# Patient Record
Sex: FEMALE | Race: WHITE | NOT HISPANIC OR LATINO | Employment: UNEMPLOYED | URBAN - METROPOLITAN AREA
[De-identification: names, ages, dates, MRNs, and addresses within clinical notes are randomized per-mention and may not be internally consistent; named-entity substitution may affect disease eponyms.]

---

## 2021-04-29 ENCOUNTER — LAB (OUTPATIENT)
Dept: LAB | Facility: HOSPITAL | Age: 61
End: 2021-04-29

## 2021-04-29 ENCOUNTER — OFFICE VISIT (OUTPATIENT)
Dept: ENDOCRINOLOGY | Facility: CLINIC | Age: 61
End: 2021-04-29

## 2021-04-29 VITALS
WEIGHT: 159 LBS | DIASTOLIC BLOOD PRESSURE: 88 MMHG | TEMPERATURE: 97.3 F | SYSTOLIC BLOOD PRESSURE: 156 MMHG | BODY MASS INDEX: 28.17 KG/M2 | HEIGHT: 63 IN | HEART RATE: 64 BPM | OXYGEN SATURATION: 100 %

## 2021-04-29 DIAGNOSIS — E11.65 UNCONTROLLED TYPE 2 DIABETES MELLITUS WITH HYPERGLYCEMIA (HCC): Primary | ICD-10-CM

## 2021-04-29 DIAGNOSIS — E11.65 UNCONTROLLED TYPE 2 DIABETES MELLITUS WITH HYPERGLYCEMIA (HCC): ICD-10-CM

## 2021-04-29 PROCEDURE — 36415 COLL VENOUS BLD VENIPUNCTURE: CPT

## 2021-04-29 PROCEDURE — 86341 ISLET CELL ANTIBODY: CPT

## 2021-04-29 PROCEDURE — 99204 OFFICE O/P NEW MOD 45 MIN: CPT | Performed by: INTERNAL MEDICINE

## 2021-04-29 RX ORDER — INSULIN DETEMIR 100 [IU]/ML
40-45 INJECTION, SOLUTION SUBCUTANEOUS DAILY
COMMUNITY

## 2021-04-29 RX ORDER — ERGOCALCIFEROL (VITAMIN D2) 10 MCG
400 TABLET ORAL 2 TIMES DAILY
COMMUNITY

## 2021-04-29 RX ORDER — ATORVASTATIN CALCIUM 40 MG/1
40 TABLET, FILM COATED ORAL DAILY
COMMUNITY
End: 2022-04-05 | Stop reason: ALTCHOICE

## 2021-04-29 NOTE — ASSESSMENT & PLAN NOTE
Based upon her body build , her hx with diabetes and her lack of family hx of diabetes, I think there is a reasonable chance she is actually an adult onset type 1 diabetic.  We will check serologies.  If positive, we will stop metformin and adjust insulin to control her blood sugars using data from the wade device I gave her    If negative we will add an sglt2 inhibitor. Side effects were reviewed

## 2021-05-01 LAB — GAD65 AB SER IA-ACNC: 239.2 U/ML (ref 0–5)

## 2021-05-03 LAB — PANC ISLET CELL AB TITR SER: NEGATIVE {TITER}

## 2021-05-14 NOTE — TELEPHONE ENCOUNTER
Called pt - left a message to return call.  We down loaded her wade and lcm want to increase insulin at meals by 4 units.  she voiced understanding  She would like to continue the wade.  Will send in rx.  Last visit 4/29/21  Next visit 7/30/21

## 2021-09-10 ENCOUNTER — TELEPHONE (OUTPATIENT)
Dept: INTERNAL MEDICINE | Facility: CLINIC | Age: 61
End: 2021-09-10

## 2021-09-10 NOTE — TELEPHONE ENCOUNTER
Caller: SUZANNE     Relationship: [facility] [place] OFFICE    Best call back number: [phone]    What is the best time to reach you: CAIT    Who are you requesting to speak with (clinical staff, provider,  specific staff member): CE    Do you know the name of the person who called: NA    What was the call regarding: SUZANNE CALLED TO VERIFY PATIENTS INSULIN ASPART (NOVOLOG FLEXPEN) 100 UNIT/ML INSTRUCTIONS; WHETHER THE INSTRUCTIONS IS BASED ON PATIENTS GLUCOSE LEVELS.    Do you require a callback: YES         “ Thank you for sharing this information with me. I will send a message to the . Please allow up to 48 hours for the  to follow up on this request.”

## 2021-10-22 ENCOUNTER — OFFICE VISIT (OUTPATIENT)
Dept: ENDOCRINOLOGY | Facility: CLINIC | Age: 61
End: 2021-10-22

## 2021-10-22 VITALS
OXYGEN SATURATION: 97 % | BODY MASS INDEX: 28.88 KG/M2 | WEIGHT: 163 LBS | DIASTOLIC BLOOD PRESSURE: 84 MMHG | HEIGHT: 63 IN | HEART RATE: 61 BPM | SYSTOLIC BLOOD PRESSURE: 135 MMHG

## 2021-10-22 DIAGNOSIS — E10.65 UNCONTROLLED TYPE 1 DIABETES MELLITUS WITH HYPERGLYCEMIA (HCC): Primary | ICD-10-CM

## 2021-10-22 PROBLEM — E11.65 UNCONTROLLED TYPE 2 DIABETES MELLITUS WITH HYPERGLYCEMIA (HCC): Status: RESOLVED | Noted: 2021-04-29 | Resolved: 2021-10-22

## 2021-10-22 LAB
EXPIRATION DATE: ABNORMAL
EXPIRATION DATE: NORMAL
GLUCOSE BLDC GLUCOMTR-MCNC: 417 MG/DL (ref 70–130)
HBA1C MFR BLD: 10.3 %
Lab: ABNORMAL
Lab: NORMAL

## 2021-10-22 PROCEDURE — 82947 ASSAY GLUCOSE BLOOD QUANT: CPT | Performed by: INTERNAL MEDICINE

## 2021-10-22 PROCEDURE — 83036 HEMOGLOBIN GLYCOSYLATED A1C: CPT | Performed by: INTERNAL MEDICINE

## 2021-10-22 PROCEDURE — 3046F HEMOGLOBIN A1C LEVEL >9.0%: CPT | Performed by: INTERNAL MEDICINE

## 2021-10-22 PROCEDURE — 99213 OFFICE O/P EST LOW 20 MIN: CPT | Performed by: INTERNAL MEDICINE

## 2021-10-22 RX ORDER — BLOOD SUGAR DIAGNOSTIC
1 STRIP MISCELLANEOUS 3 TIMES DAILY
COMMUNITY
Start: 2021-10-15

## 2021-10-22 NOTE — PROGRESS NOTES
"     Office Note      Date: 10/22/2021  Patient Name: Milagros Lim  MRN: [id number]  : 1960    Chief Complaint   Patient presents with   • Diabetes     type 2       History of Present Illness:   Milagros Lim is a 61 y.o. female who presents for Diabetes -turns out to be type 1 with + GADA antibodies  She has a wade reader and used the sensor for 2 weeks and would like to continue  Otherwise is checking bg at least 4 times per day.  Hypoglycemia- was 61 this am.      Last A1c:13.3  Hemoglobin A1C   Date Value Ref Range Status   10/22/2021 10.3 % Final       Changes in health since last visit: more hypoglycemia. Last eye exam way overdue .    Subjective            Review of Systems:   Review of Systems   Constitutional: Negative.    Eyes: Negative.        The following portions of the patient's history were reviewed and updated as appropriate: allergies, current medications, past family history, past medical history, past social history, past surgical history and problem list.    Objective     Visit Vitals  /84 (BP Location: Left arm, Patient Position: Sitting, Cuff Size: Adult)   Pulse 61   Ht 160 cm (63\")   Wt 73.9 kg (163 lb)   SpO2 97%   BMI 28.87 kg/m²       Labs:    CMP  No results found for: GLUCOSE, BUN, CREATININE, EGFRIFNONA, EGFRIFAFRI, BCR, K, CO2, CALCIUM, PROTENTOTREF, LABIL2, BILIRUBIN, AST, ALT     CBC w/DIFF  No results found for: WBC, RBC, HGB, HCT, MCV, MCH, MCHC, RDW, RDWSD, MPV, PLT, NEUTRORELPCT, LYMPHORELPCT, MONORELPCT, EOSRELPCT, BASORELPCT, AUTOIGPER, NEUTROABS, LYMPHSABS, MONOSABS, EOSABS, BASOSABS, AUTOIGNUM, NRBC    Physical Exam:  Physical Exam  Vitals reviewed.   Constitutional:       Appearance: Normal appearance.   Neurological:      Mental Status: She is alert.          Assessment / Plan      Assessment & Plan:  Problem List Items Addressed This Visit        Other    Uncontrolled type 1 diabetes mellitus with hyperglycemia (HCC) - Primary    Current " Assessment & Plan     Diabetes is improving with treatment.   Continue current treatment regimen.  Diabetes will be reassessed in 3 months     Best a1c for her ever.  The plan is insulin  And to get a wade. That will be through a medical supply store. .         Relevant Medications    insulin aspart (novoLOG FLEXPEN) 100 UNIT/ML solution pen-injector sc pen    insulin detemir (Levemir FlexTouch) 100 UNIT/ML injection    Other Relevant Orders    POC Glycosylated Hemoglobin (Hb A1C) (Completed)    POC Glucose, Blood (Completed)           Neo Escobar MD   10/22/2021

## 2021-10-22 NOTE — ASSESSMENT & PLAN NOTE
Diabetes is improving with treatment.   Continue current treatment regimen.  Diabetes will be reassessed in 3 months     Best a1c for her ever.  The plan is insulin  And to get a wade. That will be through a medical supply store. .

## 2021-11-04 ENCOUNTER — TELEPHONE (OUTPATIENT)
Dept: ENDOCRINOLOGY | Facility: CLINIC | Age: 61
End: 2021-11-04

## 2021-11-15 ENCOUNTER — TELEPHONE (OUTPATIENT)
Dept: ENDOCRINOLOGY | Facility: CLINIC | Age: 61
End: 2021-11-15

## 2022-04-05 ENCOUNTER — OFFICE VISIT (OUTPATIENT)
Dept: ENDOCRINOLOGY | Facility: CLINIC | Age: 62
End: 2022-04-05

## 2022-04-05 VITALS
HEART RATE: 80 BPM | SYSTOLIC BLOOD PRESSURE: 168 MMHG | HEIGHT: 63 IN | OXYGEN SATURATION: 98 % | BODY MASS INDEX: 29.77 KG/M2 | DIASTOLIC BLOOD PRESSURE: 90 MMHG | WEIGHT: 168 LBS

## 2022-04-05 DIAGNOSIS — E10.65 UNCONTROLLED TYPE 1 DIABETES MELLITUS WITH HYPERGLYCEMIA: Primary | ICD-10-CM

## 2022-04-05 DIAGNOSIS — E10.649 TYPE 1 DIABETES MELLITUS WITH HYPOGLYCEMIA AND WITHOUT COMA: ICD-10-CM

## 2022-04-05 DIAGNOSIS — I10 ESSENTIAL HYPERTENSION: ICD-10-CM

## 2022-04-05 LAB
EXPIRATION DATE: ABNORMAL
EXPIRATION DATE: NORMAL
GLUCOSE BLDC GLUCOMTR-MCNC: 273 MG/DL (ref 70–130)
HBA1C MFR BLD: 9.1 %
Lab: ABNORMAL
Lab: NORMAL

## 2022-04-05 PROCEDURE — 99214 OFFICE O/P EST MOD 30 MIN: CPT | Performed by: PHYSICIAN ASSISTANT

## 2022-04-05 PROCEDURE — 83036 HEMOGLOBIN GLYCOSYLATED A1C: CPT | Performed by: PHYSICIAN ASSISTANT

## 2022-04-05 PROCEDURE — 3046F HEMOGLOBIN A1C LEVEL >9.0%: CPT | Performed by: PHYSICIAN ASSISTANT

## 2022-04-05 PROCEDURE — 95251 CONT GLUC MNTR ANALYSIS I&R: CPT | Performed by: PHYSICIAN ASSISTANT

## 2022-04-05 RX ORDER — LOSARTAN POTASSIUM 50 MG/1
50 TABLET ORAL DAILY
COMMUNITY
Start: 2022-02-21

## 2022-04-05 NOTE — PATIENT INSTRUCTIONS
Levemir 30 units once daily at bedtime  Novolog 6-8 units with breakfast and Lunch 10-12 with supper  Try to be more consistent with meals-- regular times, consistent carb intake

## 2022-04-05 NOTE — PROGRESS NOTES
Office Note      Date: 2022  Patient Name: Milagros Lim  MRN: [id number]  : 1960    Chief Complaint   Patient presents with   • Diabetes       History of Present Illness:   Milagros Lmi is a 61 y.o. female who presents for follow-up for type 1 diabetes.  She is accompanied by her brother today.  Recently she has had a lot of trouble with hypoglycemia.  She reports she was diagnosed with COVID in February.  She was not hospitalized but had a lot of gastrointestinal symptoms symptoms and had to get fluids going to the emergency department.  She reports following COVID she started having a lot of trouble with hypoglycemia.  Last week she was at jury duty and they had to call EMS because her blood glucose had dropped to 38 mg/dL.  Her brother decided enough is enough and bought her the freestyle wade sensor which she was unable to afford before.  Reports she has since been in contact with her insurance and they are shipping her freestyle wade to use.  Reports last week she started reducing her Levemir 40 to 45 units.  She reports this week she has been doing 30 units at bedtime and has not had any more trouble with hypoglycemia.  She reports she has not been consistently eating regular meals but did have to reduce her NovoLog she was taking 6 to 8 units with meals.  She reports she feels bad if her blood glucose is 90 mg/dL or less.  She reports she is up-to-date on her eye exam she went after Dr. Escobar advised this at her appointment in October.  She reports she was started on losartan 50 mg daily for elevated blood pressure.  She has been checking her blood pressure readings at home and these are typically 120-140/80.  She has been on the losartan 2 to 3 weeks.      Subjective     Review of Systems:   Review of Systems   Constitutional: Negative.    Cardiovascular: Negative.    Gastrointestinal: Negative.    Endocrine: Negative.    Neurological: Negative.        The following  "portions of the patient's history were reviewed and updated as appropriate: allergies, current medications, past family history, past medical history, past social history, past surgical history and problem list.    Objective     Vitals:    04/05/22 0955   BP: 168/90   Pulse: 80   SpO2: 98%   Weight: 76.2 kg (168 lb)   Height: 160 cm (63\")   PainSc: 0-No pain     Body mass index is 29.76 kg/m².    Physical Exam  Vitals reviewed.   Constitutional:       General: She is not in acute distress.     Appearance: Normal appearance.   Neurological:      Mental Status: She is alert.         HEMOGLOBIN A1C  Lab Results   Component Value Date    HGBA1C 9.1 04/05/2022       GLUCOSE  Glucose   Date Value Ref Range Status   04/05/2022 273 (A) 70 - 130 mg/dL Final           Assessment / Plan      Assessment & Plan:  1. Uncontrolled type 1 diabetes mellitus with hyperglycemia (HCC)  A1c today has improved as compared to October at 9.1%.  This is still above goal.  I reviewed her freestyle wade download today.  She has had fluctuating blood sugars.  For now she will continue the Levemir 30 units at bedtime and she will continue 6 to 8 units with breakfast and lunch but we will increase her NovoLog with supper to 10 to 12 units.  I encouraged regular eating habits with consistent carbohydrate intake.  I suggested diabetes education but she refused at this time.  She will send in blood sugar readings for review and adjustment as needed.  I suggested she keep her follow-up appointment in May in case she continues to have trouble with blood glucose readings if her blood sugars start to improve in the next few weeks she will reschedule this appointment for 3 months from now.  Weight is up 5 pounds since her appointment in October.  - POC Glycosylated Hemoglobin (Hb A1C)  - POC Glucose, Blood    2. Type 1 diabetes mellitus with hypoglycemia and without coma (HCC)  Has not had any more trouble with hypoglycemia since she reduced her " insulin.  She will continue the 30 units of Levemir at bedtime and NovoLog 6 to 8 units with breakfast and lunch and 10 to 12 units with supper.  Continue to use the freestyle wade which alerts her when her blood glucose is getting low.    3. Essential hypertension  Her blood pressure is elevated today.  She reports she forgot to take her losartan last night.  Her reported blood pressures at home have been okay.  For now she will continue the losartan 50 mg daily.      Return for keep May  appt for now.     This note was dictated using Dragon voice recognition.    Christine Osorio PA-C  04/05/2022

## 2022-09-14 NOTE — TELEPHONE ENCOUNTER
[facility] NEEDS NEW PRESCRIPTION FOR KATIE 2 SENSORS. FAX NUMBER [phone]

## 2022-10-06 ENCOUNTER — OFFICE VISIT (OUTPATIENT)
Dept: ENDOCRINOLOGY | Facility: CLINIC | Age: 62
End: 2022-10-06

## 2022-10-06 VITALS
BODY MASS INDEX: 31.36 KG/M2 | HEIGHT: 63 IN | HEART RATE: 75 BPM | DIASTOLIC BLOOD PRESSURE: 83 MMHG | SYSTOLIC BLOOD PRESSURE: 130 MMHG | OXYGEN SATURATION: 96 % | WEIGHT: 177 LBS

## 2022-10-06 DIAGNOSIS — E10.65 UNCONTROLLED TYPE 1 DIABETES MELLITUS WITH HYPERGLYCEMIA: Primary | ICD-10-CM

## 2022-10-06 LAB
EXPIRATION DATE: ABNORMAL
EXPIRATION DATE: NORMAL
GLUCOSE BLDC GLUCOMTR-MCNC: 315 MG/DL (ref 70–130)
HBA1C MFR BLD: 8.4 %
Lab: ABNORMAL
Lab: NORMAL

## 2022-10-06 PROCEDURE — 83036 HEMOGLOBIN GLYCOSYLATED A1C: CPT | Performed by: INTERNAL MEDICINE

## 2022-10-06 PROCEDURE — 3052F HG A1C>EQUAL 8.0%<EQUAL 9.0%: CPT | Performed by: INTERNAL MEDICINE

## 2022-10-06 PROCEDURE — 99213 OFFICE O/P EST LOW 20 MIN: CPT | Performed by: INTERNAL MEDICINE

## 2022-10-06 PROCEDURE — 95251 CONT GLUC MNTR ANALYSIS I&R: CPT | Performed by: INTERNAL MEDICINE

## 2022-10-06 RX ORDER — ATORVASTATIN CALCIUM 40 MG/1
40 TABLET, FILM COATED ORAL DAILY
COMMUNITY
Start: 2022-09-29

## 2022-10-06 NOTE — PROGRESS NOTES
"     Office Note      Date: 10/06/2022  Patient Name: Milagros Lim  MRN: [id number]  : 1960    Chief Complaint   Patient presents with   • Diabetes       History of Present Illness:   Milagros Lim is a 62 y.o. female who presents for Diabetes - TYPE 1  RX: 4 INSULIN SHOTS PER DAY  BG CHECKS- are done ad enrique with a wade.   2 weeks  Of wade  Were  Reviewed. 3 %  Low. 47 % in rnage. 26 % high and 24 % very high     Last A1c:  Hemoglobin A1C   Date Value Ref Range Status   10/06/2022 8.4 % Final       Changes in health since last visit: none . Last eye exam UP TO DATE.    Subjective          Review of Systems:   Review of Systems   Constitutional: Negative.    HENT: Negative.    Eyes: Negative.    Respiratory: Negative.        The following portions of the patient's history were reviewed and updated as appropriate: allergies, current medications, past family history, past medical history, past social history, past surgical history and problem list.    Objective     Visit Vitals  /83   Pulse 75   Ht 160 cm (63\")   Wt 80.3 kg (177 lb)   SpO2 96%   BMI 31.35 kg/m²       Labs:    CMP  No results found for: GLUCOSE, BUN, CREATININE, EGFRIFNONA, EGFRIFAFRI, BCR, K, CO2, CALCIUM, PROTENTOTREF, LABIL2, BILIRUBIN, AST, ALT     CBC w/DIFF  No results found for: WBC, RBC, HGB, HCT, MCV, MCH, MCHC, RDW, RDWSD, MPV, PLT, NEUTRORELPCT, LYMPHORELPCT, MONORELPCT, EOSRELPCT, BASORELPCT, AUTOIGPER, NEUTROABS, LYMPHSABS, MONOSABS, EOSABS, BASOSABS, AUTOIGNUM, NRBC    Physical Exam:  Physical Exam  Vitals reviewed.   Constitutional:       Appearance: Normal appearance. She is normal weight.   Cardiovascular:      Pulses:           Dorsalis pedis pulses are 2+ on the right side and 2+ on the left side.        Posterior tibial pulses are 2+ on the right side and 2+ on the left side.   Musculoskeletal:      Right foot: Normal range of motion. No deformity, bunion, Charcot foot, foot drop or prominent metatarsal " heads.      Left foot: Normal range of motion. No deformity, bunion, Charcot foot, foot drop or prominent metatarsal heads.   Feet:      Right foot:      Protective Sensation: 5 sites tested. 5 sites sensed.      Skin integrity: Skin integrity normal.      Toenail Condition: Right toenails are normal.      Left foot:      Protective Sensation: 5 sites tested. 5 sites sensed.      Skin integrity: Skin integrity normal.      Toenail Condition: Left toenails are normal.      Comments: Diabetic Foot Exam Performed and Monofilament Test Performed    Neurological:      Mental Status: She is alert.   Psychiatric:         Mood and Affect: Mood normal.         Behavior: Behavior normal.         Thought Content: Thought content normal.         Judgment: Judgment normal.          Assessment / Plan      Assessment & Plan:  Problem List Items Addressed This Visit        Other    Uncontrolled type 1 diabetes mellitus with hyperglycemia (HCC) - Primary    Current Assessment & Plan      Improved with a1c down to 8.4   pattern on wade shows she is taking too much levemir and not enough  novolog  Reduce levemir by 4 units and take 2 more units of log at each  meal         Relevant Medications    insulin aspart (novoLOG FLEXPEN) 100 UNIT/ML solution pen-injector sc pen    insulin detemir (Levemir FlexTouch) 100 UNIT/ML injection    Other Relevant Orders    POC Glucose, Blood (Completed)    POC Glycosylated Hemoglobin (Hb A1C) (Completed)           Neo Escobar MD   10/06/2022

## 2022-10-06 NOTE — ASSESSMENT & PLAN NOTE
Improved with a1c down to 8.4   pattern on wade shows she is taking too much levemir and not enough  novolog  Reduce levemir by 4 units and take 2 more units of log at each  meal

## 2022-12-29 ENCOUNTER — TELEPHONE (OUTPATIENT)
Dept: ENDOCRINOLOGY | Facility: CLINIC | Age: 62
End: 2022-12-29

## 2022-12-29 NOTE — TELEPHONE ENCOUNTER
Pt called back. Pt needs freestyle transmitter- not the sensors    Please send prescription to     [facility]  Phone: [phone] Fax: [phone]      Call pt   [phone]

## 2022-12-29 NOTE — TELEPHONE ENCOUNTER
Pt called and Pt's freestyle wade broke.     [facility]  [facility] [place] [place]  Phone: [phone]  Fax: [phone]    Call pt and can leave message  [phone]

## 2023-04-18 ENCOUNTER — OFFICE VISIT (OUTPATIENT)
Dept: ENDOCRINOLOGY | Facility: CLINIC | Age: 63
End: 2023-04-18
Payer: MEDICARE

## 2023-04-18 VITALS
BODY MASS INDEX: 31.86 KG/M2 | TEMPERATURE: 97.1 F | WEIGHT: 179.8 LBS | DIASTOLIC BLOOD PRESSURE: 88 MMHG | HEIGHT: 63 IN | SYSTOLIC BLOOD PRESSURE: 180 MMHG | OXYGEN SATURATION: 98 % | RESPIRATION RATE: 21 BRPM | HEART RATE: 65 BPM

## 2023-04-18 DIAGNOSIS — I10 ESSENTIAL HYPERTENSION: ICD-10-CM

## 2023-04-18 DIAGNOSIS — E10.65 UNCONTROLLED TYPE 1 DIABETES MELLITUS WITH HYPERGLYCEMIA: Primary | ICD-10-CM

## 2023-04-18 LAB
ALBUMIN UR-MCNC: <1.2 MG/DL
CREAT UR-MCNC: 33 MG/DL
EXPIRATION DATE: ABNORMAL
EXPIRATION DATE: NORMAL
GLUCOSE BLDC GLUCOMTR-MCNC: 164 MG/DL (ref 70–130)
HBA1C MFR BLD: 8.4 %
Lab: ABNORMAL
Lab: NORMAL
MICROALBUMIN/CREAT UR: NORMAL MG/G{CREAT}

## 2023-04-18 PROCEDURE — 82570 ASSAY OF URINE CREATININE: CPT | Performed by: INTERNAL MEDICINE

## 2023-04-18 PROCEDURE — 3052F HG A1C>EQUAL 8.0%<EQUAL 9.0%: CPT | Performed by: INTERNAL MEDICINE

## 2023-04-18 PROCEDURE — 82947 ASSAY GLUCOSE BLOOD QUANT: CPT | Performed by: INTERNAL MEDICINE

## 2023-04-18 PROCEDURE — 99214 OFFICE O/P EST MOD 30 MIN: CPT | Performed by: INTERNAL MEDICINE

## 2023-04-18 PROCEDURE — 3079F DIAST BP 80-89 MM HG: CPT | Performed by: INTERNAL MEDICINE

## 2023-04-18 PROCEDURE — 83036 HEMOGLOBIN GLYCOSYLATED A1C: CPT | Performed by: INTERNAL MEDICINE

## 2023-04-18 PROCEDURE — 1160F RVW MEDS BY RX/DR IN RCRD: CPT | Performed by: INTERNAL MEDICINE

## 2023-04-18 PROCEDURE — 1159F MED LIST DOCD IN RCRD: CPT | Performed by: INTERNAL MEDICINE

## 2023-04-18 PROCEDURE — 3077F SYST BP >= 140 MM HG: CPT | Performed by: INTERNAL MEDICINE

## 2023-04-18 PROCEDURE — 82043 UR ALBUMIN QUANTITATIVE: CPT | Performed by: INTERNAL MEDICINE

## 2023-04-18 RX ORDER — LOSARTAN POTASSIUM 100 MG/1
100 TABLET ORAL DAILY
Qty: 90 TABLET | Refills: 3 | Status: SHIPPED | OUTPATIENT
Start: 2023-04-18 | End: 2024-04-17

## 2023-04-18 NOTE — PROGRESS NOTES
"     Office Note      Date: 2023  Patient Name: Milagros Lim  MRN: [id number]  : 1960    Chief Complaint   Patient presents with   • Diabetes     X6 Months fu       History of Present Illness:   Milagros Lim is a 62 y.o. female who presents for Diabetes type 1.   Current RX 4 insulin shots per day    Bg checks are done:>10 times per day  With wade   Hypoglycemia :rare/  None severe in the last 3 months.   Wade has  Been a big help       Last A1c:8.4  Hemoglobin A1C   Date Value Ref Range Status   2023 8.4 % Final       Changes in health since last visit: none . Last eye exam due \up to date on cholesterol and foot check.    Subjective              Review of Systems:   Review of Systems   Respiratory: Negative for chest tightness and shortness of breath.    Skin: Negative for wound.       The following portions of the patient's history were reviewed and updated as appropriate: allergies, current medications, past family history, past medical history, past social history, past surgical history and problem list.    Objective     Visit Vitals  /88   Pulse 65   Temp 97.1 °F (36.2 °C) (Infrared)   Resp 21   Ht 160 cm (62.99\")   Wt 81.6 kg (179 lb 12.8 oz)   SpO2 98%   BMI 31.86 kg/m²           Physical Exam:  Physical Exam  Vitals reviewed.   Constitutional:       Appearance: Normal appearance.   Neurological:      Mental Status: She is alert.   Psychiatric:         Mood and Affect: Mood normal.         Behavior: Behavior normal.         Thought Content: Thought content normal.         Judgment: Judgment normal.          Assessment / Plan      Assessment & Plan:  Problem List Items Addressed This Visit        Other    Uncontrolled type 1 diabetes mellitus with hyperglycemia - Primary    Current Assessment & Plan      a1c unchanged but not a disaster/ pattern on the  Wade ( reviewed on her phone) shows she  Continues to take too much levemir and not enough insulin at meals. " Doses were adjusted          Relevant Medications    insulin aspart (novoLOG FLEXPEN) 100 UNIT/ML solution pen-injector sc pen    insulin detemir (Levemir FlexTouch) 100 UNIT/ML injection    Other Relevant Orders    POC Glycosylated Hemoglobin (Hb A1C) (Completed)    POC Glucose, Blood (Completed)    Microalbumin / Creatinine Urine Ratio - Urine, Clean Catch    Essential hypertension    Current Assessment & Plan     bp too high both here and at home. Will increase losartan          Relevant Medications    losartan (Cozaar) 100 MG tablet        Neo Escobar MD   04/18/2023

## 2023-04-18 NOTE — ASSESSMENT & PLAN NOTE
a1c unchanged but not a disaster/ pattern on the  Fede ( reviewed on her phone) shows she  Continues to take too much levemir and not enough insulin at meals. Doses were adjusted

## 2023-04-26 RX ORDER — INSULIN LISPRO 100 [IU]/ML
8-20 INJECTION, SOLUTION INTRAVENOUS; SUBCUTANEOUS
Qty: 18 ML | Refills: 5 | Status: SHIPPED | OUTPATIENT
Start: 2023-04-26

## 2023-04-26 NOTE — TELEPHONE ENCOUNTER
[facility] [phone]  pts novolog is not covered it needs a pa they will fax over the denial to us   or call something else in for her  humalog or lispro

## 2024-01-02 NOTE — TELEPHONE ENCOUNTER
Rx Refill Note  Requested Prescriptions     Pending Prescriptions Disp Refills    Continuous Blood Gluc Sensor (FreeStyle Fede 2 Sensor) misc [Pharmacy Med Name: FREESTYLE FEDE 2 SENSOR [place] Miscellaneous] 2 each 7     Sig: USE AS DIRECTED. CHANGE EVERY 14 DAYS      Last office visit with prescribing clinician: 4/18/2023   Last telemedicine visit with prescribing clinician: Visit date not found   Next office visit with prescribing clinician: Visit date not found                         Would you like a call back once the refill request has been completed: [] Yes [] No    If the office needs to give you a call back, can they leave a voicemail: [] Yes [] No    Hina Lay MA  01/02/24, 10:59 EST

## 2024-02-14 RX ORDER — INSULIN LISPRO 100 [IU]/ML
INJECTION, SOLUTION INTRAVENOUS; SUBCUTANEOUS
Qty: 15 ML | Refills: 5 | Status: SHIPPED | OUTPATIENT
Start: 2024-02-14

## 2024-04-09 DIAGNOSIS — I10 ESSENTIAL HYPERTENSION: ICD-10-CM

## 2024-04-09 NOTE — TELEPHONE ENCOUNTER
Rx Refill Note  Requested Prescriptions     Pending Prescriptions Disp Refills    losartan (COZAAR) 100 MG tablet [Pharmacy Med Name: LOSARTAN POTASSIUM 100 MG T 100 Tablet] 90 tablet 3     Sig: TAKE 1 TABLET BY MOUTH DAILY        Last office visit with prescribing clinician: 4/18/2023      Next office visit with prescribing clinician: Visit date not found       Michelle Hardwick (Jodi)  04/09/24, 14:12 EDT

## 2024-04-10 RX ORDER — LOSARTAN POTASSIUM 100 MG/1
100 TABLET ORAL DAILY
Qty: 90 TABLET | Refills: 1 | Status: SHIPPED | OUTPATIENT
Start: 2024-04-10

## 2024-07-29 NOTE — TELEPHONE ENCOUNTER
Called and spoke with patient to schedule her follow up appt for us to refill her Fede sensors.  Patient is no longer seeing us,   patient is now with [facility].

## 2024-08-07 NOTE — TELEPHONE ENCOUNTER
Spoke with patient.  Patient is now seeing [facility] for diabetes care.  Sent to [place] by her insurance. Has not seen Dr Escobar in apprx one year.

## 2024-12-12 DIAGNOSIS — I10 ESSENTIAL HYPERTENSION: ICD-10-CM

## 2024-12-12 RX ORDER — LOSARTAN POTASSIUM 100 MG/1
100 TABLET ORAL DAILY
Qty: 90 TABLET | Refills: 1 | OUTPATIENT
Start: 2024-12-12

## 2025-04-24 ENCOUNTER — LAB REQUISITION (OUTPATIENT)
Dept: LAB | Facility: HOSPITAL | Age: 65
End: 2025-04-24
Payer: MEDICARE

## 2025-04-24 DIAGNOSIS — H18.512 ENDOTHELIAL CORNEAL DYSTROPHY, LEFT EYE: ICD-10-CM

## 2025-04-24 PROCEDURE — 87102 FUNGUS ISOLATION CULTURE: CPT | Performed by: OPHTHALMOLOGY

## 2025-05-01 LAB — FUNGUS WND CULT: NORMAL

## 2025-05-08 LAB — FUNGUS WND CULT: NORMAL

## 2025-05-15 LAB — FUNGUS WND CULT: NORMAL

## 2025-05-22 LAB — FUNGUS WND CULT: NORMAL

## 2025-05-29 LAB — FUNGUS WND CULT: NORMAL

## 2025-06-05 LAB — FUNGUS WND CULT: NORMAL
